# Patient Record
Sex: FEMALE | Race: WHITE | NOT HISPANIC OR LATINO | Employment: FULL TIME | URBAN - METROPOLITAN AREA
[De-identification: names, ages, dates, MRNs, and addresses within clinical notes are randomized per-mention and may not be internally consistent; named-entity substitution may affect disease eponyms.]

---

## 2017-03-03 ENCOUNTER — CLINICAL SUPPORT (OUTPATIENT)
Dept: OBSTETRICS AND GYNECOLOGY | Facility: CLINIC | Age: 42
End: 2017-03-03
Payer: COMMERCIAL

## 2017-03-03 DIAGNOSIS — Z30.42 ENCOUNTER FOR SURVEILLANCE OF INJECTABLE CONTRACEPTIVE: Primary | ICD-10-CM

## 2017-03-03 PROCEDURE — 96372 THER/PROPH/DIAG INJ SC/IM: CPT | Mod: S$GLB,,, | Performed by: OBSTETRICS & GYNECOLOGY

## 2017-03-03 RX ADMIN — MEDROXYPROGESTERONE ACETATE 150 MG: 150 INJECTION, SUSPENSION INTRAMUSCULAR at 08:03

## 2017-05-19 ENCOUNTER — CLINICAL SUPPORT (OUTPATIENT)
Dept: OBSTETRICS AND GYNECOLOGY | Facility: CLINIC | Age: 42
End: 2017-05-19
Payer: COMMERCIAL

## 2017-05-19 DIAGNOSIS — Z30.42 ENCOUNTER FOR SURVEILLANCE OF INJECTABLE CONTRACEPTIVE: Primary | ICD-10-CM

## 2017-05-19 PROCEDURE — 96372 THER/PROPH/DIAG INJ SC/IM: CPT | Mod: S$GLB,,, | Performed by: OBSTETRICS & GYNECOLOGY

## 2017-05-19 RX ADMIN — MEDROXYPROGESTERONE ACETATE 150 MG: 150 INJECTION, SUSPENSION INTRAMUSCULAR at 08:05

## 2017-05-19 NOTE — PROGRESS NOTES
Patient identified by name and  with verbal feedback. Depo Provera 150 mg administered IM to right upper outer quadrant using aseptic technique. Patient tolerated well, no adverse reactions noted/reported. Next injection due 17 - 17 and has been scheduled for 17./anisha

## 2017-05-22 ENCOUNTER — OFFICE VISIT (OUTPATIENT)
Dept: FAMILY MEDICINE | Facility: CLINIC | Age: 42
End: 2017-05-22
Payer: COMMERCIAL

## 2017-05-22 VITALS
DIASTOLIC BLOOD PRESSURE: 70 MMHG | SYSTOLIC BLOOD PRESSURE: 110 MMHG | HEART RATE: 70 BPM | WEIGHT: 205 LBS | TEMPERATURE: 98 F | BODY MASS INDEX: 32.18 KG/M2 | OXYGEN SATURATION: 99 % | HEIGHT: 67 IN | RESPIRATION RATE: 18 BRPM

## 2017-05-22 DIAGNOSIS — B95.8 STAPH SKIN INFECTION: Primary | ICD-10-CM

## 2017-05-22 DIAGNOSIS — L08.9 STAPH SKIN INFECTION: Primary | ICD-10-CM

## 2017-05-22 DIAGNOSIS — Z86.14 HISTORY OF MRSA INFECTION: ICD-10-CM

## 2017-05-22 PROCEDURE — 1160F RVW MEDS BY RX/DR IN RCRD: CPT | Mod: ,,, | Performed by: NURSE PRACTITIONER

## 2017-05-22 PROCEDURE — 99214 OFFICE O/P EST MOD 30 MIN: CPT | Mod: ,,, | Performed by: NURSE PRACTITIONER

## 2017-05-22 RX ORDER — MEDROXYPROGESTERONE ACETATE 150 MG/ML
150 INJECTION, SUSPENSION INTRAMUSCULAR
COMMUNITY

## 2017-05-22 RX ORDER — MUPIROCIN 20 MG/G
OINTMENT TOPICAL 3 TIMES DAILY
Qty: 1 TUBE | Refills: 2 | Status: SHIPPED | OUTPATIENT
Start: 2017-05-22

## 2017-05-22 NOTE — PATIENT INSTRUCTIONS
Methicillin-Resistant Staphylococcus aureus (MRSA)  What is MRSA?  Staphylococcus aureus bacteria or staph are common germs. They are normally found on the skin or in the nose of many people. Usually, the bacteria cause no problem. Occasionally, they can cause mild skin infections. Or severe infections of the skin, lungs, blood, or other organs or tissues may develop. Some staph infections can be easily treated with antibiotics. But one type of staph infection, methicillin-resistant staphylococcus aureus (MRSA) cannot. It is called methicillin-resistant because the antibiotic, methicillin, which used to be effective treatment, no longer works. MRSA is common in hospitals and nursing homes or long-term care facilities. It is also spreading among healthy children and adults outside the healthcare system. A person may be a carrier. Or he or she may have the infection.  · Colonization. When a person carries the MRSA bacteria but is healthy, it's called being colonized. This person can spread MRSA to others but has no infection.  · Infection. When a person gets sick because of the bacteria, it's called being infected with MRSA. This person can also spread MRSA to others. If not treated properly, MRSA infections can be very serious and even cause death.    What are the risk factors for MRSA?  Anyone can get MRSA, although there are factors that increase the risk. Some of these include:  · Recent or lengthy hospital stay  · Having a surgical wound or intravenous (IV) line  · Having a weakened immune system due to a medical condition or its treatment  · Living in a nursing home or long-term care facility  · Recent antibiotic therapy  · Diabetes  · Kidney dialysis  · HIV infection  · Injection drug use or sharing needles  · [facility] or [facility] time  · Living in any crowded facility, such as a dormitory  ·  service  · Sharing sports equipment, razors, or other sharp objects  How does MRSA spread?  · People who are  colonized with MRSA have MRSA in their noses or on their skin. Though they may not be sick themselves, they can spread the germs to others.  · In hospitals and long-term care facilities, MRSA can spread from patient to patient on the hands of healthcare workers. It can also spread on objects, such as cart or door handles and bedrails.  · Outside healthcare settings, MRSA usually spreads through skin-to-skin contact, shared towels or athletic equipment, or through close contact with an infected person.  What are the symptoms of MRSA infection?  MRSA skin infections start as small red bumps on the skin that look like pimples or spider bites. The small bumps usually get larger and become swollen, painful, warm to the touch, and filled with pus. Fever may be present. MRSA can also start in other ways. And it can spread deeper into the body where it can cause one or more of the following:  · Infections in bones, muscles, and other tissues  · Pneumonia, an infection in one or both lungs  · Infection of a surgical wound  · Infection in the bloodstream (bacteremia)  · Infection of the lining of the heart (endocarditis)  · Infection of the urinary tract (bladder and kidneys)  How is MRSA diagnosed?  A sample of blood, urine, or infected tissue may be taken to diagnose a MRSA infection. A swab of the inside of the nose is taken to diagnose colonization. The sample is then sent to a laboratory and tested for MRSA. if the infection involves bone, joint, or other organs, a blood test may be done. Imaging studies, such as an X-ray or CT scan, may also be needed.  How is MRSA treated?  MRSA infections are usually treated with antibiotics. It may be given by mouth in pill form or into a vein (intravenous or IV). If a skin abscess is present, it may be drained.   Patients who test positive for MRSA colonization may undergo a process called decolonization. A topical antibiotic is applied inside the nose or in the nostrils to kill the  bacteria. A special soap may be used to cleanse the skin.  Can MRSA be prevented?  Hospitals and nursing homes help prevent MRSA by doing the following:  · Handwashing. This is the single most important way to prevent the spread of germs. Healthcare workers should wash their hands with soap and water or an alcohol-based hand  before and after treating each patient. They also should clean their hands after touching any surface that may be contaminated.  · Protective clothing. Healthcare workers and visitors may wear gloves and a gown when entering the room of a patient with MRSA. They remove these items before leaving.  · Private rooms. Patients with MRSA infections are placed in private rooms or in a room with others who have the same infection.  · Personal care items. Patients with MRSA may have their own patient care items, such as thermometers and stethoscopes.  · Monitoring. Hospitals monitor the spread of MRSA and educate all staff on the best ways to prevent it.  Patients can help prevent MRSA by doing the following:  · Ask all hospital staff to wash their hands before touching you. Dont be afraid to speak up!  · Wash your own hands frequently with soap and water. Or use an alcohol-based hand gel.  · Ask that stethoscopes and other instruments be wiped with alcohol before they are used on you.  · Be sure youre tested for MRSA if you have a skin infection.  If you are taking care of someone with MRSA:  · Wash your hands well with soap and water before and after any contact with the person.  · Wear gloves when changing a bandage or touching an infected wound. Discard gloves after each use. Then wash your hands well.  · Wash the patient's bed linens, towels, and clothing in hot water with detergent or liquid bleach.  Everyone can help prevent MRSA by doing the following:  · Wash your hands often with warm water and soap.  ¨ Rub your hands together.  ¨ Clean the whole hand, under your nails, between your  fingers, and up the wrists.  ¨ Wash for at least 15 to 20 seconds.  ¨ Rinse, letting the water run down your fingers, not up your wrists.  ¨ Dry your hands well. Use a paper towel or turn off the faucet and open the door.  · If soap and water aren't available, use an alcohol-based hand .  ¨ Squeeze about a tablespoon of  into the palm of one hand.  ¨ Rub your hands together briskly, cleaning the backs of your hands, the palms, between your fingers, and up the wrists.  ¨ Rub until the  is gone and your hands are completely dry.  · Keep cuts and scrapes clean and covered until they heal.  · Avoid contact with the wounds or bandages of others.  · Avoid sharing towels, razors, clothing, and athletic equipment.  Date Last Reviewed: 10/1/2016  © 4709-8446 [employer], "[employer]. [address] [place] [address], [place] [address]. All rights reserved. This information is not intended as a substitute for professional medical care. Always follow your healthcare professional's instructions.

## 2017-05-22 NOTE — PROGRESS NOTES
Subjective:       Patient ID: Saritha Joy is a 42 y.o. female.    Chief Complaint: Recurrent Skin Infections (X's 8 months, has been treated with antibiotics multiple times)    Subjective:    Saritha Joy is a 42 y.o. female who presents for evaluation of a possible skin infection located ***. Symptoms include {symptoms:28166}. Patient denies {symptoms:91187}. Precipitating event: {cellulitis event:14433}. Treatment to date has included {treatment:70371} with {relief:84938} relief.    {Common ambulatory SmartLinks:80777}    Review of Systems  {ros - complete:44217}      Objective:    {Exam, Complete:59265}      Assessment:    Cellulitis of the ***.     Plan:    {Plan; cellulitis:4300869277}         Review of Systems    Objective:      Physical Exam    Assessment:       1. Staph skin infection    2. History of MRSA infection        Plan:       Saritha was seen today for recurrent skin infections.    Diagnoses and all orders for this visit:    Staph skin infection  -     mupirocin (BACTROBAN) 2 % ointment; Apply topically 3 (three) times daily.    History of MRSA infection

## 2017-05-22 NOTE — PROGRESS NOTES
Subjective:       Patient ID: Saritha Joy is a 42 y.o. female.    Chief Complaint: Recurrent Skin Infections (X's 8 months, has been treated with antibiotics multiple times)    Staph Infection    Chief Complaint: Recurrent skin infections 6 months       HPI: This 42 y.o. y/o White female was in excellent health with recurrent skin infection x 6 months.  Was seen at Urgent care and completed 3 rounds of antibiotics.  Currently she has multiple sores on arms, abdomen, leg, and groin.      Review of Systems:   Constitutional: no recent wt. loss, fatigue, change in activity, or sleep pattern   Eyes: no recent visual changes   E.N.T. : no sore throat or symptoms suggestive of sinusitis   Cardiovascular: no history of heart murmur   Respiratory: no cough, difficulty breathing or chest pain   GI: no diarrhea, vomiting or abdominal pain.   : urination and urine output normal   Musculoskeletal: no bone or joint pain   Skin: see HPI  Neurologic: no history of seizures, change in mental status, or walking difficulty   Psychiatric: no unusual behavior   Endocrine: no signs suggestive of diabetes,thyroid disease, or other endocrine disorders   Hematologic: no anemia, pallor, or bruising   Other: parent has no other concerns    PMH: No serious illnesses, hospitalizations or chronic medical conditions other than that in HPI     GENERAL: No apparent discomfort or distress. Cooperative and pleasant   HEENT: There are no lesions of the head. GINI. Both TM's were visualized and were normal with excellent mobility. Neck is supple. No pharyngeal exudates or erythema. There is no thyromegaly.   CHEST: External chest normal. Breasts without lesions. Equal expansion with no retractions. Palpation confirms equal expansion.  Both lung fields were clear to auscultation and to percussion. No rales, wheezes or rhonchi were noted.   CARDIAC: PMI not visualized. Active precordium by palpation. S1 and S2 were normal and no murmurs, rubs or  extra sounds were heard.   ABDOMEN: On inspection, the abdomen appears normal. Palpation revealed no hepatosplenomegaly, no tenderness, rebound or evidence of ascites. No other masses were noted on exam. Rectal deferred.   BONES/JOINTS/SPINE: good mobility, no bone pain   GENITALIA: Normal. No lesions.   EXTREMITIES: There is no evidence of edema, nor is there any cyanosis. Capillary refill is brisk <2 sec.   SKIN:  Pustules - multiple  LYMPHATIC: some small nodes palpated in anterior cervical triangle and inguinal regions. No supraclavicular nor axillary adenopathy.     NEUROLOGIC EXAM:   Mental status: appropriate responses for age   Cranial Nerves: 2-12 intact   Motor: good strength, symmetric   Sensation: intact   Reflexes: brisk and symmetric   Cerebellar: normal gait for age                 Review of Systems   Skin:        Multiple pustules noted to arms, legs, abdomen, and groin       Objective:      Physical Exam   Skin: Skin is warm, dry and intact. No rash noted.   Multiple pustules noted to arms, legs, abdomen, and groin - no swelling, drainage or warmth noted        Assessment:       1. Staph skin infection    2. History of MRSA infection        Plan:       Saritha was seen today for recurrent skin infections.    Diagnoses and all orders for this visit:    Staph skin infection  -     mupirocin (BACTROBAN) 2 % ointment; Apply topically 3 (three) times daily.  Plan: Mupirocin nasal ointment in nose b.i.d. X 5 days                Add Clorox (2 teaspoons/gal of water, max. 2/3 cup) to bath water twice weekly                      X 4   wks.               Clip fingernails               Socks on hands at night               E-mail F/U in 30 days     History of MRSA infection

## 2017-08-04 ENCOUNTER — PATIENT MESSAGE (OUTPATIENT)
Dept: OBSTETRICS AND GYNECOLOGY | Facility: CLINIC | Age: 42
End: 2017-08-04

## 2017-08-04 ENCOUNTER — CLINICAL SUPPORT (OUTPATIENT)
Dept: OBSTETRICS AND GYNECOLOGY | Facility: CLINIC | Age: 42
End: 2017-08-04
Payer: COMMERCIAL

## 2017-08-04 DIAGNOSIS — Z30.42 ENCOUNTER FOR SURVEILLANCE OF INJECTABLE CONTRACEPTIVE: Primary | ICD-10-CM

## 2017-08-04 PROCEDURE — 96372 THER/PROPH/DIAG INJ SC/IM: CPT | Mod: S$GLB,,, | Performed by: OBSTETRICS & GYNECOLOGY

## 2017-08-04 RX ORDER — MEDROXYPROGESTERONE ACETATE 150 MG/ML
150 INJECTION, SUSPENSION INTRAMUSCULAR
Status: SHIPPED | OUTPATIENT
Start: 2017-08-04

## 2017-08-04 RX ADMIN — MEDROXYPROGESTERONE ACETATE 150 MG: 150 INJECTION, SUSPENSION INTRAMUSCULAR at 08:08

## 2017-10-20 ENCOUNTER — CLINICAL SUPPORT (OUTPATIENT)
Dept: OBSTETRICS AND GYNECOLOGY | Facility: CLINIC | Age: 42
End: 2017-10-20
Payer: COMMERCIAL

## 2017-10-20 DIAGNOSIS — Z30.42 ENCOUNTER FOR SURVEILLANCE OF INJECTABLE CONTRACEPTIVE: Primary | ICD-10-CM

## 2017-10-20 PROCEDURE — 96372 THER/PROPH/DIAG INJ SC/IM: CPT | Mod: S$GLB,,, | Performed by: OBSTETRICS & GYNECOLOGY

## 2017-10-20 RX ADMIN — MEDROXYPROGESTERONE ACETATE 150 MG: 150 INJECTION, SUSPENSION INTRAMUSCULAR at 07:10

## 2018-01-05 ENCOUNTER — PATIENT MESSAGE (OUTPATIENT)
Dept: OBSTETRICS AND GYNECOLOGY | Facility: CLINIC | Age: 43
End: 2018-01-05

## 2018-01-05 ENCOUNTER — CLINICAL SUPPORT (OUTPATIENT)
Dept: OBSTETRICS AND GYNECOLOGY | Facility: CLINIC | Age: 43
End: 2018-01-05
Payer: COMMERCIAL

## 2018-01-05 DIAGNOSIS — Z30.42 ENCOUNTER FOR SURVEILLANCE OF INJECTABLE CONTRACEPTIVE: Primary | ICD-10-CM

## 2018-01-05 PROCEDURE — 96372 THER/PROPH/DIAG INJ SC/IM: CPT | Mod: S$GLB,,, | Performed by: NURSE PRACTITIONER

## 2018-01-05 RX ADMIN — MEDROXYPROGESTERONE ACETATE 150 MG: 150 INJECTION, SUSPENSION INTRAMUSCULAR at 07:01

## 2018-01-05 NOTE — PROGRESS NOTES
2 patient identifiers used, Depo provera 150 mg IM to right gluteal, tolerated injection well and scheduled next appointment.

## 2022-08-03 ENCOUNTER — HOSPITAL ENCOUNTER (EMERGENCY)
Facility: HOSPITAL | Age: 47
Discharge: HOME | End: 2022-08-03
Attending: EMERGENCY MEDICINE

## 2022-08-03 VITALS
WEIGHT: 193 LBS | HEART RATE: 75 BPM | DIASTOLIC BLOOD PRESSURE: 69 MMHG | BODY MASS INDEX: 32.15 KG/M2 | HEIGHT: 65 IN | OXYGEN SATURATION: 99 % | TEMPERATURE: 97 F | SYSTOLIC BLOOD PRESSURE: 140 MMHG | RESPIRATION RATE: 18 BRPM

## 2022-08-03 DIAGNOSIS — R21 RASH: Primary | ICD-10-CM

## 2022-08-03 PROCEDURE — 99282 EMERGENCY DEPT VISIT SF MDM: CPT

## 2022-08-04 NOTE — ED PROVIDER NOTES
Encounter Date: 8/3/2022    SCRIBE #1 NOTE: I, Dena Lowe, am scribing for, and in the presence of, Jas Spears MD.       History     Chief Complaint   Patient presents with    Rash     Rash to bilateral hands and forearms x 1 month      Time seen by provider: 10:59 PM on 2022    Saritha Joy is a 47 y.o. female who presents to the ED with an onset of pruritic rash to bilateral hands and forearms x 1 month. Rash was initially localized to bilateral hands but has more recently spread to forearms. She has been applying lotion which has provided little to no relief. The patient denies mouth lesions or any other symptoms at this time. She denies any unusual exposures or excessive hand washing. 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 No previous visits to dermatologist. No pertinent PMHx or PSHx.    The history is provided by the patient.     Review of patient's allergies indicates:   Allergen Reactions    Codeine Nausea And Vomiting     No past medical history on file.  Past Surgical History:   Procedure Laterality Date    ECTOPIC PREGNANCY SURGERY  2014    TONSILLECTOMY, ADENOIDECTOMY, BILATERAL MYRINGOTOMY AND TUBES  1982     Family History   Problem Relation Age of Onset    Breast cancer Neg Hx     Colon cancer Neg Hx     Ovarian cancer Neg Hx     Cancer Neg Hx      Social History     Tobacco Use    Smoking status: Former Smoker     Quit date: 2011     Years since quittin.5   Substance Use Topics    Alcohol use: Yes     Comment: rarely    Drug use: Yes     Types: Marijuana     Review of Systems   Constitutional: Negative for fever.   HENT: Negative for mouth sores and sore throat.    Respiratory: Negative for shortness of breath.    Cardiovascular: Negative for chest pain.   Gastrointestinal: Negative for nausea.   Genitourinary: Negative for dysuria.   Musculoskeletal: Negative for back pain.   Skin: Positive for rash.    Neurological: Negative for weakness.   Hematological: Does not bruise/bleed easily.       Physical Exam     Initial Vitals [08/03/22 2043]   BP Pulse Resp Temp SpO2   (!) 140/69 75 18 97.4 °F (36.3 °C) 99 %      MAP       --         Physical Exam    Nursing note and vitals reviewed.  Constitutional: She appears well-developed and well-nourished. She is not diaphoretic. No distress.   HENT:   Head: Normocephalic and atraumatic.   Mouth/Throat: Oropharynx is clear and moist.   Eyes: Conjunctivae are normal. No scleral icterus.   Neck: Neck supple.   Cardiovascular: Normal rate, regular rhythm, normal heart sounds and intact distal pulses. Exam reveals no gallop and no friction rub.    No murmur heard.  Pulmonary/Chest: Breath sounds normal. She has no wheezes. She has no rhonchi. She has no rales.   Abdominal: Abdomen is soft. She exhibits no distension. There is no abdominal tenderness. There is no guarding.   Musculoskeletal:         General: No edema. Normal range of motion.      Cervical back: Neck supple.     Neurological: She is alert and oriented to person, place, and time.   Skin: Capillary refill takes less than 2 seconds. Rash noted. No petechiae and no purpura noted. Rash is papular. Rash is not vesicular. No erythema.   Papular rash to distal upper extremities. No linear lesions or involvement of web spaces of hands. Marked xerosis to hands with cracking of skin. No petechiae, purpura, vesicles, or bullae. Blanches to direct pressure. Nonconfluent. No intraoral lesions or rash noted elsewhere.   Psychiatric: Her speech is normal.         ED Course   Procedures  Labs Reviewed - No data to display       Imaging Results    None          Medications - No data to display  Medical Decision Making:   History:   Old Medical Records: I decided to obtain old medical records.          Scribe Attestation:   Scribe #1: I performed the above scribed service and the documentation accurately describes the services I  performed. I attest to the accuracy of the note.               I, Dr. Jas Spears, personally performed the services described in this documentation. All medical record entries made by the scribe were at my direction and in my presence.  I have reviewed the chart and agree that the record reflects my personal performance and is accurate and complete. Jas Spears MD.  11:23 PM 08/03/2022    Saritha Joy is a 47 y.o. female presenting with rash to the upper extremities for at least a month.  Exam shows noted dry skin with superimposed nonspecific papular appearing rash.  There is no petechiae or purpura.  I doubt endocarditis or other emergent length process.  This is not consistent with appearance of scabies.  I doubt syphilis or other acute infectious process.  Time course would be atypical for viral exanthem such as Coxsackie virus.  Patient has no sign of other end-organ dysfunction.  I doubt life-threatening process such as SJS or TEN.  I do think she would benefit from outpatient dermatology follow-up I have reviewed with her in detail.  Detailed return precautions reviewed.  Moisturization of the hands pending follow-up reviewed as well.    Clinical Impression:   Final diagnoses:  [R21] Rash (Primary)          ED Disposition Condition    Discharge Stable        ED Prescriptions     None        Follow-up Information     Follow up With Specialties Details Why Contact Info    Ghada Carroll MD Dermatology  Dermatology, 1 week [address] [place] [address] Dr Nirav JONAS [id number]  [phone]             Jas Spears MD  08/03/22 2193

## 2022-08-04 NOTE — FIRST PROVIDER EVALUATION
Emergency Department TeleTriage Encounter Note      CHIEF COMPLAINT    Chief Complaint   Patient presents with    Rash     Rash to bilateral hands and forearms x 1 month        VITAL SIGNS   Initial Vitals [08/03/22 2043]   BP Pulse Resp Temp SpO2   (!) 140/69 75 18 97.4 °F (36.3 °C) 99 %      MAP       --            ALLERGIES    Review of patient's allergies indicates:   Allergen Reactions    Codeine Nausea And Vomiting       PROVIDER TRIAGE NOTE  This is a teletriage evaluation of a 47 y.o. female presenting to the ED complaining of rash to hands and arms for one month. Reports it started on right hand and then has spread.  Reports extremely pruritic.  Reports she has some spots that appear to be blisters that drain clear fluids.  Denies fevers.  Denies any other associated symptoms.  Exam limited as this is virtual - dyshydrosis, impetigo, monkey pox differential.  Will defer tx to onsite provider.    Initial orders will be placed and care will be transferred to an alternate provider when patient is roomed for a full evaluation. Any additional orders and the final disposition will be determined by that provider.           ORDERS  Labs Reviewed - No data to display    ED Orders (720h ago, onward)    None            Virtual Visit Note: The provider triage portion of this emergency department evaluation and documentation was performed via [employer], a HIPAA-compliant telemedicine application, in concert with a tele-presenter in the room. A face to face patient evaluation with one of my colleagues will occur once the patient is placed in an emergency department room.      DISCLAIMER: This note was prepared with [employer] voice recognition transcription software. Garbled syntax, mangled pronouns, and other bizarre constructions may be attributed to that software system.

## 2022-08-04 NOTE — DISCHARGE INSTRUCTIONS
[facility] [place] - [phone] - If needed for outpatient dermatology follow up

## 2024-01-08 ENCOUNTER — HOSPITAL ENCOUNTER (EMERGENCY)
Facility: HOSPITAL | Age: 49
Discharge: HOME | End: 2024-01-08
Attending: EMERGENCY MEDICINE

## 2024-01-08 VITALS
SYSTOLIC BLOOD PRESSURE: 122 MMHG | WEIGHT: 193 LBS | BODY MASS INDEX: 32.15 KG/M2 | RESPIRATION RATE: 18 BRPM | DIASTOLIC BLOOD PRESSURE: 66 MMHG | HEIGHT: 65 IN | HEART RATE: 67 BPM | TEMPERATURE: 98 F | OXYGEN SATURATION: 98 %

## 2024-01-08 DIAGNOSIS — N75.1 ABSCESS OF RIGHT BARTHOLIN'S GLAND: Primary | ICD-10-CM

## 2024-01-08 PROCEDURE — 99284 EMERGENCY DEPT VISIT MOD MDM: CPT | Mod: 25

## 2024-01-08 PROCEDURE — 56420 I&D BARTHOLINS GLAND ABSCESS: CPT

## 2024-01-08 PROCEDURE — 25000003 PHARM REV CODE 250: Performed by: EMERGENCY MEDICINE

## 2024-01-08 PROCEDURE — 25000003 PHARM REV CODE 250: Performed by: PHYSICIAN ASSISTANT

## 2024-01-08 RX ORDER — SULFAMETHOXAZOLE AND TRIMETHOPRIM 800; 160 MG/1; MG/1
1 TABLET ORAL 2 TIMES DAILY
Qty: 14 TABLET | Refills: 0 | Status: SHIPPED | OUTPATIENT
Start: 2024-01-08 | End: 2024-01-15

## 2024-01-08 RX ORDER — LIDOCAINE HYDROCHLORIDE 10 MG/ML
10 INJECTION INFILTRATION; PERINEURAL
Status: COMPLETED | OUTPATIENT
Start: 2024-01-08 | End: 2024-01-08

## 2024-01-08 RX ORDER — SULFAMETHOXAZOLE AND TRIMETHOPRIM 800; 160 MG/1; MG/1
1 TABLET ORAL
Status: COMPLETED | OUTPATIENT
Start: 2024-01-08 | End: 2024-01-08

## 2024-01-08 RX ORDER — OXYCODONE HYDROCHLORIDE 5 MG/1
5 TABLET ORAL
Status: COMPLETED | OUTPATIENT
Start: 2024-01-08 | End: 2024-01-08

## 2024-01-08 RX ORDER — HYDROCODONE BITARTRATE AND ACETAMINOPHEN 5; 325 MG/1; MG/1
1 TABLET ORAL EVERY 6 HOURS PRN
Qty: 12 TABLET | Refills: 0 | Status: SHIPPED | OUTPATIENT
Start: 2024-01-08 | End: 2024-01-11

## 2024-01-08 RX ORDER — ONDANSETRON 4 MG/1
4 TABLET, ORALLY DISINTEGRATING ORAL
Status: COMPLETED | OUTPATIENT
Start: 2024-01-08 | End: 2024-01-08

## 2024-01-08 RX ORDER — ONDANSETRON 4 MG/1
4 TABLET, ORALLY DISINTEGRATING ORAL EVERY 6 HOURS PRN
Qty: 20 TABLET | Refills: 0 | Status: SHIPPED | OUTPATIENT
Start: 2024-01-08

## 2024-01-08 RX ORDER — ACETAMINOPHEN 500 MG
1000 TABLET ORAL
Status: COMPLETED | OUTPATIENT
Start: 2024-01-08 | End: 2024-01-08

## 2024-01-08 RX ORDER — LIDOCAINE HYDROCHLORIDE 10 MG/ML
10 INJECTION INFILTRATION; PERINEURAL ONCE
Status: DISCONTINUED | OUTPATIENT
Start: 2024-01-08 | End: 2024-01-08 | Stop reason: HOSPADM

## 2024-01-08 RX ADMIN — OXYCODONE 5 MG: 5 TABLET ORAL at 06:01

## 2024-01-08 RX ADMIN — ACETAMINOPHEN 1000 MG: 500 TABLET ORAL at 06:01

## 2024-01-08 RX ADMIN — SULFAMETHOXAZOLE AND TRIMETHOPRIM 1 TABLET: 800; 160 TABLET ORAL at 07:01

## 2024-01-08 RX ADMIN — LIDOCAINE HYDROCHLORIDE 10 ML: 10 INJECTION, SOLUTION INFILTRATION; PERINEURAL at 07:01

## 2024-01-08 RX ADMIN — ONDANSETRON 4 MG: 4 TABLET, ORALLY DISINTEGRATING ORAL at 06:01

## 2024-01-08 NOTE — FIRST PROVIDER EVALUATION
Emergency Department TeleTriage Encounter Note      CHIEF COMPLAINT    Chief Complaint   Patient presents with    Abscess     Vaginal area        VITAL SIGNS   Initial Vitals [01/08/24 1502]   BP Pulse Resp Temp SpO2   122/60 71 18 98.3 °F (36.8 °C) 99 %      MAP       --            ALLERGIES    Review of patient's allergies indicates:   Allergen Reactions    Codeine Nausea And Vomiting       PROVIDER TRIAGE NOTE  This is a teletriage evaluation of a 48 y.o. female presenting to the ED complaining of  abscess for the past few days. Denies drainage.     Alert, well-appearing, ambulatory.     Initial orders will be placed and care will be transferred to an alternate provider when patient is roomed for a full evaluation. Any additional orders and the final disposition will be determined by that provider.         ORDERS  Labs Reviewed   POCT URINE PREGNANCY       ED Orders (720h ago, onward)      Start Ordered     Status Ordering Provider    01/08/24 1506 01/08/24 1505  POCT urine pregnancy  Once         Ordered SIENA RIVERA N.    01/08/24 1506 01/08/24 1505  Setup Patient for Pelvic Exam  Once         Ordered SIENA RIVERA N.    01/08/24 1505 01/08/24 1505  LIDOcaine HCL 10 mg/ml (1%) injection 10 mL  Once         Ordered SIENA RIVERA              Virtual Visit Note: The provider triage portion of this emergency department evaluation and documentation was performed via [employer], a HIPAA-compliant telemedicine application, in concert with a tele-presenter in the room. A face to face patient evaluation with one of my colleagues will occur once the patient is placed in an emergency department room.      DISCLAIMER: This note was prepared with [employer] voice recognition transcription software. Garbled syntax, mangled pronouns, and other bizarre constructions may be attributed to that software system.

## 2024-01-09 NOTE — ED PROVIDER NOTES
Encounter Date: 2024       History     Chief Complaint   Patient presents with    Abscess     Vaginal area      Saritha Joy is a 48 y.o. female presenting for evaluation of swelling and pain to her right sided labia, persisting for the last several days.  No fever, no chills.  No abnormal vaginal bleeding or discharge.  No dysuria or hematuria.  She hasn't taken any medication for her symptoms.  She has never had symptoms like this previously. She has no past medical history on file.      The history is provided by the patient.     Review of patient's allergies indicates:   Allergen Reactions    Codeine Nausea And Vomiting     No past medical history on file.  Past Surgical History:   Procedure Laterality Date    ECTOPIC PREGNANCY SURGERY  2014    TONSILLECTOMY, ADENOIDECTOMY, BILATERAL MYRINGOTOMY AND TUBES  1982     Family History   Problem Relation Age of Onset    Breast cancer Neg Hx     Colon cancer Neg Hx     Ovarian cancer Neg Hx     Cancer Neg Hx      Social History     Tobacco Use    Smoking status: Former     Current packs/day: 0.00     Types: Cigarettes     Quit date: 2011     Years since quittin.9   Substance Use Topics    Alcohol use: Yes     Comment: rarely    Drug use: Yes     Types: Marijuana     Review of Systems   Constitutional:  Negative for chills and fever.   Respiratory:  Negative for cough, chest tightness, shortness of breath and wheezing.    Cardiovascular:  Negative for chest pain and palpitations.   Gastrointestinal:  Negative for abdominal pain, diarrhea, nausea and vomiting.   Genitourinary:  Positive for vaginal pain. Negative for dysuria, hematuria, pelvic pain, vaginal bleeding and vaginal discharge.   Musculoskeletal:  Negative for arthralgias, back pain, joint swelling, myalgias, neck pain and neck stiffness.   Skin:  Positive for wound. Negative for color change, pallor and rash.   Neurological:  Negative for weakness and numbness.   Hematological:  Does not  bruise/bleed easily.       Physical Exam     Initial Vitals [01/08/24 1502]   BP Pulse Resp Temp SpO2   122/60 71 18 98.3 °F (36.8 °C) 99 %      MAP       --         Physical Exam    Nursing note and vitals reviewed.  Constitutional: She appears well-developed and well-nourished. She is not diaphoretic. No distress.   HENT:   Head: Normocephalic and atraumatic.   Eyes: Conjunctivae are normal.   Cardiovascular:  Normal rate, regular rhythm, normal heart sounds and intact distal pulses.           Pulmonary/Chest: Breath sounds normal. No respiratory distress. She has no wheezes. She has no rhonchi. She has no rales.   Abdominal: Abdomen is soft. She exhibits no distension and no mass. There is no abdominal tenderness.   Genitourinary:    Pelvic exam was performed with patient supine.   There is lesion on the right labia. There is no rash, tenderness or injury on the right labia. There is no rash, tenderness, lesion or injury on the left labia.    Genitourinary Comments: Moderately sized area of erythema, swelling and TTP noted to right labia.           Musculoskeletal:         General: No tenderness or edema. Normal range of motion.     Neurological: She is alert and oriented to person, place, and time. She has normal strength. No sensory deficit.   Skin: Skin is warm and dry. No rash and no abscess noted. No erythema.         ED Course   I & D - Incision and Drainage    Date/Time: 1/8/2024 2:54 PM  Location procedure was performed: [facility] EMERGENCY DEPARTMENT    Performed by: Alissa George PA-C  Authorized by: Darion Menendez MD  Type: abscess (Bartholin's)  Body area: anogenital  Location details: Bartholin's gland  Anesthesia: local infiltration    Anesthesia:  Local Anesthetic: lidocaine 1% without epinephrine  Anesthetic total: 2 mL    Patient sedated: no  Scalpel size: 11  Incision type: single straight  Incision depth: dermal  Complexity: complex  Drainage: serosanguinous and purulent  Drainage amount:  copious  Wound treatment: incision, drainage, expression of material and drain placed  Packing material: Word catheter  Patient tolerance: Patient tolerated the procedure well with no immediate complications    Incision depth: dermal        Labs Reviewed   POCT URINE PREGNANCY          Imaging Results    None          Medications   LIDOcaine HCL 10 mg/ml (1%) injection 10 mL (has no administration in time range)   LIDOcaine HCL 10 mg/ml (1%) injection 10 mL (has no administration in time range)   oxyCODONE immediate release tablet 5 mg (5 mg Oral Given 1/8/24 1801)   ondansetron disintegrating tablet 4 mg (4 mg Oral Given 1/8/24 1802)   acetaminophen tablet 1,000 mg (1,000 mg Oral Given 1/8/24 1802)   sulfamethoxazole-trimethoprim 800-160mg per tablet 1 tablet (1 tablet Oral Given 1/8/24 1920)     Medical Decision Making  Differential Diagnosis:   Bartholin's   Cellulitis   Folliculitis       Pt emergently evaluated here in the ED.    Symptoms consistent with Bartholin's abscess.  She tolerated the I&D well.  Word catheter was placed.  She will be discharged home to follow-up with GYN for re-evaluation and further management.  Will initiate Bactrim.  Patient voices understanding and is agreeable to the plan.  Specific return precautions are given.       Risk  OTC drugs.  Prescription drug management.                                      Clinical Impression:  Final diagnoses:  [N75.1] Abscess of right Bartholin's gland (Primary)          ED Disposition Condition    Discharge Stable          ED Prescriptions       Medication Sig Dispense Start Date End Date Auth. Provider    HYDROcodone-acetaminophen (NORCO) 5-325 mg per tablet Take 1 tablet by mouth every 6 (six) hours as needed for Pain. 12 tablet 1/8/2024 1/11/2024 Alissa George PA-C    ondansetron (ZOFRAN-ODT) 4 MG TbDL Take 1 tablet (4 mg total) by mouth every 6 (six) hours as needed (nausea and vomiting). 20 tablet 1/8/2024 -- Alissa George PA-C     sulfamethoxazole-trimethoprim 800-160mg (BACTRIM DS) 800-160 mg Tab Take 1 tablet by mouth 2 (two) times daily. for 7 days 14 tablet 1/8/2024 1/15/2024 Alissa George PA-C          Follow-up Information       Follow up With Specialties Details Why Contact Info Additional Information    [facility] ED Emergency Medicine  As needed, If symptoms worsen [address] [place] [address] [address] [place] [id number] 1st floor    Janusz Osborn MD Obstetrics and Gynecology  As needed, If symptoms worsen [address] [facility]  [address]  [facility] [address]  [phone]                Alissa George PA-C  01/08/24 1937